# Patient Record
Sex: MALE | Race: BLACK OR AFRICAN AMERICAN | NOT HISPANIC OR LATINO | Employment: STUDENT | URBAN - METROPOLITAN AREA
[De-identification: names, ages, dates, MRNs, and addresses within clinical notes are randomized per-mention and may not be internally consistent; named-entity substitution may affect disease eponyms.]

---
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
## 2023-02-13 ENCOUNTER — HOSPITAL ENCOUNTER (OUTPATIENT)
Dept: RADIOLOGY | Facility: HOSPITAL | Age: 23
Discharge: HOME | End: 2023-02-13
Attending: FAMILY MEDICINE
Payer: MEDICAID

## 2023-02-13 DIAGNOSIS — S91.339A PUNCTURE WOUND OF FOOT: ICD-10-CM

## 2023-02-13 PROCEDURE — 73630 X-RAY EXAM OF FOOT: CPT | Mod: TC,FY,PO,LT

## 2023-02-13 PROCEDURE — 73630 XR FOOT COMPLETE 3 VIEW LEFT: ICD-10-PCS | Mod: 26,LT,, | Performed by: RADIOLOGY

## 2023-02-13 PROCEDURE — 73630 X-RAY EXAM OF FOOT: CPT | Mod: 26,LT,, | Performed by: RADIOLOGY

## 2024-01-19 NOTE — PROGRESS NOTES
Mr. Estevez was called on 9/18/17 to schedule for an outpatient physical therapy appointment, s/p partial lateral meniscectomy right knee, as we had received insurance authorization for him to attend physical therapy. A message was left on his mother's voice mail for him to to call and schedule an appointment. We did not hear from the patient or his parents.  Another call was made today, 9/26/17, to again try to schedule patient for outpatient physical therapy.  Yonathan Middleton, our  spoke with Elías's father who stated that Elías would not be attending outpatient PT at this time as he (Elías)  was currently in [facility] in [place] waiting to have surgery for a broken jaw. Patient's father was told to call us when patient was able to attend PT.      Jodie Jovel,PT         [FreeTextEntry1] : pt with dyspnea pt has hx of asbestosis exposure followed with yearly ct scans dr levy pt with aaa 4.5  pt with htn on losartan/hctz/metopolol pt has dysphagia pt with fatigue

## (undated) DEVICE — SOL IRR NACL .9% 3000ML

## (undated) DEVICE — WRAP KNEE DURA*SOFT W/2 INSERT

## (undated) DEVICE — PADDING CAST 4IN SPECIALIST

## (undated) DEVICE — SEE MEDLINE ITEM 146231

## (undated) DEVICE — TOURNIQUET HEMACLEAR X-LARGE

## (undated) DEVICE — BLADE ULTRACUT 3.5MM

## (undated) DEVICE — DRESSING XEROFORM FOIL PK 1X8

## (undated) DEVICE — NDL SPINAL 18GX3.5 SPINOCAN

## (undated) DEVICE — GAUZE SPONGE 4X4 12PLY

## (undated) DEVICE — SEE MEDLINE ITEM 146270

## (undated) DEVICE — DRAPE STERI U-SHAPED 47X51IN

## (undated) DEVICE — DRAPE PLASTIC U 60X72

## (undated) DEVICE — PACK ARTHROSCOPY

## (undated) DEVICE — SUT VICRYL 3-0 27 SH

## (undated) DEVICE — SUT MCRYL PLUS 4-0 PS2 27IN

## (undated) DEVICE — BLADE SURG CARBON STEEL SZ11

## (undated) DEVICE — PADDING CAST SPECIALIST 6X4YD

## (undated) DEVICE — TUBE SET INFLOW/OUTFLOW